# Patient Record
Sex: MALE | Race: WHITE | NOT HISPANIC OR LATINO | Employment: FULL TIME | URBAN - NONMETROPOLITAN AREA
[De-identification: names, ages, dates, MRNs, and addresses within clinical notes are randomized per-mention and may not be internally consistent; named-entity substitution may affect disease eponyms.]

---

## 2020-05-13 ENCOUNTER — VIRTUAL VISIT (OUTPATIENT)
Dept: FAMILY MEDICINE | Facility: OTHER | Age: 60
End: 2020-05-13
Attending: NURSE PRACTITIONER
Payer: COMMERCIAL

## 2020-05-13 DIAGNOSIS — R52 BODY ACHES: Primary | ICD-10-CM

## 2020-05-13 PROCEDURE — 99441 ZZC PHYSICIAN TELEPHONE EVALUATION 5-10 MIN: CPT | Performed by: NURSE PRACTITIONER

## 2020-05-13 ASSESSMENT — ENCOUNTER SYMPTOMS
VOMITING: 0
ABDOMINAL PAIN: 0
CHILLS: 1
COUGH: 0
SINUS PRESSURE: 0
NAUSEA: 1
SHORTNESS OF BREATH: 0
SINUS PAIN: 0
FEVER: 1

## 2020-05-13 ASSESSMENT — PAIN SCALES - GENERAL: PAINLEVEL: MILD PAIN (3)

## 2020-05-13 NOTE — PROGRESS NOTES
"Buddy Bosch is a 59 year old male who is being evaluated via a billable telephone visit.      The patient has been notified of following:     \"This telephone visit will be conducted via a call between you and your physician/provider. We have found that certain health care needs can be provided without the need for a physical exam.  This service lets us provide the care you need with a short phone conversation.  If a prescription is necessary we can send it directly to your pharmacy.  If lab work is needed we can place an order for that and you can then stop by our lab to have the test done at a later time.    Telephone visits are billed at different rates depending on your insurance coverage. During this emergency period, for some insurers they may be billed the same as an in-person visit.  Please reach out to your insurance provider with any questions.    If during the course of the call the physician/provider feels a telephone visit is not appropriate, you will not be charged for this service.\"    Patient has given verbal consent for Telephone visit?  Yes    What phone number would you like to be contacted at? [phone]    How would you like to obtain your AVS? Mail a copy    Subjective     Buddy Bosch is a 59 year old male who presents to clinic today for the following health issues:    She reports body aches, nausea, stuffy nose and fever. He notes he hasn't been able check his temperature. He reports this started several days ago. He reports symptoms are staying the same. No vomiting. No abdominal pain. No diarrhea. He has been self isolating at home while he is ill.   His fiance is coming in today for a swab. She had symptoms before patient.   He works at [employer].   He is a smoker.   He has treated with Advil Cold and Flu.     HPI  There is no problem list on file for this patient.    History reviewed. No pertinent surgical history.    Social History     Tobacco Use     Smoking status: " Current Every Day Smoker     Smokeless tobacco: Never Used   Substance Use Topics     Alcohol use: Yes     History reviewed. No pertinent family history.      No current outpatient medications on file.     No Known Allergies    Reviewed and updated as needed this visit by Provider         Review of Systems   Constitutional: Positive for chills and fever.   HENT: Positive for congestion. Negative for ear discharge, ear pain, sinus pressure and sinus pain.    Respiratory: Negative for cough and shortness of breath.    Gastrointestinal: Positive for nausea. Negative for abdominal pain and vomiting.        Objective   Reported vitals:  There were no vitals taken for this visit.   alert and no distress  PSYCH: Alert and oriented times 3; coherent speech, normal   rate and volume, able to articulate logical thoughts, able   to abstract reason, no tangential thoughts, no hallucinations   or delusions  His affect is normal  RESP: No cough, no audible wheezing, able to talk in full sentences  Remainder of exam unable to be completed due to telephone visits    Diagnostic Test Results:  none         Assessment/Plan:  1. Body aches  Patient is symptomatic, but does not meet criteria currently for testing. No co-morbidities and is not a healthcare worker. His wife is symptomatic and had symptoms first. She meets criteria for testing, and will be presenting for curbside testing today. We agreed that we will await results for his fiance test first, since she first had symptoms. If she is positive patient would meet criteria to have testing himself. If she is negative, they will monitor symptoms at home. If other symptoms develop encouraged to be seen for further evaluation.       No follow-ups on file.      Phone call duration:  8 minutes    Jonna Mckeon NP

## 2020-10-09 ENCOUNTER — OFFICE VISIT (OUTPATIENT)
Dept: FAMILY MEDICINE | Facility: OTHER | Age: 60
End: 2020-10-09
Attending: PHYSICIAN ASSISTANT
Payer: COMMERCIAL

## 2020-10-09 VITALS
HEIGHT: 67 IN | SYSTOLIC BLOOD PRESSURE: 120 MMHG | HEART RATE: 76 BPM | OXYGEN SATURATION: 96 % | DIASTOLIC BLOOD PRESSURE: 70 MMHG | RESPIRATION RATE: 20 BRPM | BODY MASS INDEX: 27.47 KG/M2 | WEIGHT: 175 LBS | TEMPERATURE: 98.5 F

## 2020-10-09 DIAGNOSIS — Z00.00 ROUTINE GENERAL MEDICAL EXAMINATION AT A HEALTH CARE FACILITY: Primary | ICD-10-CM

## 2020-10-09 DIAGNOSIS — Z87.891 PERSONAL HISTORY OF TOBACCO USE: ICD-10-CM

## 2020-10-09 DIAGNOSIS — R17 SCLERAL ICTERUS: ICD-10-CM

## 2020-10-09 DIAGNOSIS — L91.8 SKIN TAG: ICD-10-CM

## 2020-10-09 DIAGNOSIS — Z23 NEED FOR INFLUENZA VACCINATION: ICD-10-CM

## 2020-10-09 LAB
ALBUMIN SERPL-MCNC: 4.4 G/DL (ref 3.5–5.7)
ALP SERPL-CCNC: 57 U/L (ref 34–104)
ALT SERPL W P-5'-P-CCNC: 69 U/L (ref 7–52)
ANION GAP SERPL CALCULATED.3IONS-SCNC: 8 MMOL/L (ref 3–14)
AST SERPL W P-5'-P-CCNC: 48 U/L (ref 13–39)
BASOPHILS # BLD AUTO: 0.1 10E9/L (ref 0–0.2)
BASOPHILS NFR BLD AUTO: 0.6 %
BILIRUB DIRECT SERPL-MCNC: 0.2 MG/DL (ref 0–0.2)
BILIRUB SERPL-MCNC: 0.8 MG/DL (ref 0.3–1)
BUN SERPL-MCNC: 14 MG/DL (ref 7–25)
CALCIUM SERPL-MCNC: 9.8 MG/DL (ref 8.6–10.3)
CHLORIDE SERPL-SCNC: 102 MMOL/L (ref 98–107)
CHOLEST SERPL-MCNC: 195 MG/DL
CO2 SERPL-SCNC: 26 MMOL/L (ref 21–31)
CREAT SERPL-MCNC: 1.2 MG/DL (ref 0.7–1.3)
DIFFERENTIAL METHOD BLD: NORMAL
EOSINOPHIL # BLD AUTO: 0.3 10E9/L (ref 0–0.7)
EOSINOPHIL NFR BLD AUTO: 2.4 %
ERYTHROCYTE [DISTWIDTH] IN BLOOD BY AUTOMATED COUNT: 12.4 % (ref 10–15)
GFR SERPL CREATININE-BSD FRML MDRD: 62 ML/MIN/{1.73_M2}
GLUCOSE SERPL-MCNC: 96 MG/DL (ref 70–105)
HCT VFR BLD AUTO: 49.2 % (ref 40–53)
HDLC SERPL-MCNC: 47 MG/DL (ref 23–92)
HGB BLD-MCNC: 16.7 G/DL (ref 13.3–17.7)
HIV1+2 AB SPEC QL IA.RAPID: NONREACTIVE
IMM GRANULOCYTES # BLD: 0 10E9/L (ref 0–0.4)
IMM GRANULOCYTES NFR BLD: 0.3 %
LDLC SERPL CALC-MCNC: 119 MG/DL
LYMPHOCYTES # BLD AUTO: 2.8 10E9/L (ref 0.8–5.3)
LYMPHOCYTES NFR BLD AUTO: 26.4 %
MCH RBC QN AUTO: 32.1 PG (ref 26.5–33)
MCHC RBC AUTO-ENTMCNC: 33.9 G/DL (ref 31.5–36.5)
MCV RBC AUTO: 95 FL (ref 78–100)
MONOCYTES # BLD AUTO: 0.8 10E9/L (ref 0–1.3)
MONOCYTES NFR BLD AUTO: 7.2 %
NEUTROPHILS # BLD AUTO: 6.8 10E9/L (ref 1.6–8.3)
NEUTROPHILS NFR BLD AUTO: 63.1 %
NONHDLC SERPL-MCNC: 148 MG/DL
PLATELET # BLD AUTO: 213 10E9/L (ref 150–450)
POTASSIUM SERPL-SCNC: 4 MMOL/L (ref 3.5–5.1)
PROT SERPL-MCNC: 7.6 G/DL (ref 6.4–8.9)
PSA SERPL-MCNC: 1.72 NG/ML
RBC # BLD AUTO: 5.2 10E12/L (ref 4.4–5.9)
SODIUM SERPL-SCNC: 136 MMOL/L (ref 134–144)
TRIGL SERPL-MCNC: 144 MG/DL
WBC # BLD AUTO: 10.7 10E9/L (ref 4–11)

## 2020-10-09 PROCEDURE — 80053 COMPREHEN METABOLIC PANEL: CPT | Mod: ZL | Performed by: PHYSICIAN ASSISTANT

## 2020-10-09 PROCEDURE — 93005 ELECTROCARDIOGRAM TRACING: CPT

## 2020-10-09 PROCEDURE — 86803 HEPATITIS C AB TEST: CPT | Mod: ZL | Performed by: PHYSICIAN ASSISTANT

## 2020-10-09 PROCEDURE — 90471 IMMUNIZATION ADMIN: CPT

## 2020-10-09 PROCEDURE — 85025 COMPLETE CBC W/AUTO DIFF WBC: CPT | Mod: ZL | Performed by: PHYSICIAN ASSISTANT

## 2020-10-09 PROCEDURE — 90670 PCV13 VACCINE IM: CPT

## 2020-10-09 PROCEDURE — 80061 LIPID PANEL: CPT | Mod: ZL | Performed by: PHYSICIAN ASSISTANT

## 2020-10-09 PROCEDURE — 87902 NFCT AGT GNTYP ALYS HEP C: CPT | Mod: ZL | Performed by: PHYSICIAN ASSISTANT

## 2020-10-09 PROCEDURE — 99396 PREV VISIT EST AGE 40-64: CPT | Mod: 25 | Performed by: PHYSICIAN ASSISTANT

## 2020-10-09 PROCEDURE — 99406 BEHAV CHNG SMOKING 3-10 MIN: CPT | Performed by: PHYSICIAN ASSISTANT

## 2020-10-09 PROCEDURE — 86703 HIV-1/HIV-2 1 RESULT ANTBDY: CPT | Mod: ZL | Performed by: PHYSICIAN ASSISTANT

## 2020-10-09 PROCEDURE — G0463 HOSPITAL OUTPT CLINIC VISIT: HCPCS

## 2020-10-09 PROCEDURE — 90472 IMMUNIZATION ADMIN EACH ADD: CPT

## 2020-10-09 PROCEDURE — G0463 HOSPITAL OUTPT CLINIC VISIT: HCPCS | Mod: 25

## 2020-10-09 PROCEDURE — G0009 ADMIN PNEUMOCOCCAL VACCINE: HCPCS

## 2020-10-09 PROCEDURE — 93010 ELECTROCARDIOGRAM REPORT: CPT | Performed by: INTERNAL MEDICINE

## 2020-10-09 PROCEDURE — 87522 HEPATITIS C REVRS TRNSCRPJ: CPT | Mod: ZL | Performed by: PHYSICIAN ASSISTANT

## 2020-10-09 PROCEDURE — 82248 BILIRUBIN DIRECT: CPT | Mod: ZL | Performed by: PHYSICIAN ASSISTANT

## 2020-10-09 PROCEDURE — 11200 RMVL SKIN TAGS UP TO&INC 15: CPT | Performed by: PHYSICIAN ASSISTANT

## 2020-10-09 PROCEDURE — G0296 VISIT TO DETERM LDCT ELIG: HCPCS | Performed by: PHYSICIAN ASSISTANT

## 2020-10-09 PROCEDURE — 84153 ASSAY OF PSA TOTAL: CPT | Mod: ZL | Performed by: PHYSICIAN ASSISTANT

## 2020-10-09 PROCEDURE — 36415 COLL VENOUS BLD VENIPUNCTURE: CPT | Mod: ZL | Performed by: PHYSICIAN ASSISTANT

## 2020-10-09 PROCEDURE — 90686 IIV4 VACC NO PRSV 0.5 ML IM: CPT

## 2020-10-09 PROCEDURE — G0008 ADMIN INFLUENZA VIRUS VAC: HCPCS

## 2020-10-09 SDOH — HEALTH STABILITY: MENTAL HEALTH: HOW OFTEN DO YOU HAVE A DRINK CONTAINING ALCOHOL?: 4 OR MORE TIMES A WEEK

## 2020-10-09 SDOH — HEALTH STABILITY: MENTAL HEALTH: HOW OFTEN DO YOU HAVE 6 OR MORE DRINKS ON ONE OCCASION?: LESS THAN MONTHLY

## 2020-10-09 SDOH — HEALTH STABILITY: MENTAL HEALTH: HOW MANY STANDARD DRINKS CONTAINING ALCOHOL DO YOU HAVE ON A TYPICAL DAY?: 3 OR 4

## 2020-10-09 ASSESSMENT — PAIN SCALES - GENERAL: PAINLEVEL: NO PAIN (0)

## 2020-10-09 ASSESSMENT — MIFFLIN-ST. JEOR: SCORE: 1567.42

## 2020-10-09 NOTE — PATIENT INSTRUCTIONS
"  Varenicline Tartrate (Chantix):  Chantix tablets Days                Dosage  0.5 mg                     1 through 3 0.5 mg once daily  0.5 mg                      4 through 7 0.5 mg twice daily  1.0 mg                     Day 8 to end  1 mg twice daily    Renal dosing:  For severe renal impairment, start at a dose of 0.5 mg daily and then titrate as needed to a maximum dose of 0.5 mg twice daily.  For patients with end-stage renal disease (ESRD) undergoing hemodialysis, a maximum dose of 0.5 mg daily may be administered if tolerated well.    How to take:  You will start taking Chantix one week before your quit date while you are still smoking. At first you will use the Chantix starter pack. Once you are finished with that, you will be on a \"maintenance dose\" that will probably stay the same for the rest of your treatment.  After a week of slowly increasing your dose, you will take Chantix twice a day. Take each dose after eating and with a full glass of water. Taking Chantix with food and water decreases nausea. That is also why you begin on a lower dose and slowly increase it. Once you begin the twice a day dosage, your 2 doses should be at least 8-10 hours apart and at least 4-5 hours before bedtime.      Duration of therapy: 12 weeks.  An additional course of 12 weeks of treatment is recommended if the first 12 weeks were successful to improve long term abstinence.    Adverse reactions:  The most common adverse reaction associated with varenicline treatment is nausea. Other common adverse effects include sleep disturbance, constipation, flatulence and vomiting.    There have been warnings from the FDA to be on the lookout for erratic behavior, suicidal ideation, or suicidal behavior.   There is one case report of a death, though it appears alcohol consumption may have played a part in that case.    Please report any behavior or mood changes as well as being aware of drowsiness.   Be cautious when operating " motor vehicles or dangerous machinery until the medication's effect on you is clear.    Zyban / Wellbutrin    Dosing:    Before Your Quit Date: Dose   Days 1-3 Take 150 mg by mouth once daily in the morning.   Days 4-7 (or up to 4-14 days) Take 150 mg by mouth twice daily in the morning and evening.   After Your Quit Date:    Treatment usually continues 7-12 weeks Take 150 mg by mouth twice daily in the morning and evening.       Some tips:    You will start taking bupropion at least 1 week before quitting smoking. It is okay to smoke while using bupropion.     You can use nicotine replacement therapy such as nicotine gum while using  Bupropion.     Take your 2 daily doses at least 8 hours apart.     DO NOT CRUSH OR BREAK TABLETS. Swallow the tablet whole.     You can take your medication with or without food.     Do not drink alcohol while taking this medication.     Side Effects:    Some people may experience dry mouth and insomnia. These may resolve in time.     Small frequent meals, frequent mouth care, chewing gum, or sucking lozenges may help with dry mouth.     Other possible rare side effects include constipation, nausea, headache, drowsiness, and weight loss may occur.     If you experience any other side effects that are troublesome, or if you feel something is not right, call your health care professional.           Nicotine Gum (Nicorette, polacrilex nicotine gum)      Dosing:    >25 cigarettes per day Dose   Weeks 1-6 Chew 1 piece of 4 mg gum every 1-2 hours. Maximum of 24 pieces a day.   Weeks 7-9 Chew 1 piece of 4 mg gum every 2-4 hours. Maximum of 24 pieces a day.   Weeks 10-12 Chew 1 piece of 4 mg gum every 4-8 hours. Maximum of 24 pieces a day.   < 25 cigarettes per day    Weeks 1-6 Chew 1 piece of 2 mg gum every 1-2 hours. Maximum of 24 pieces a day.   Weeks 7-9 Chew 1 piece of 2 mg gum every 2-4 hours. Maximum of 24 pieces a day.   Weeks 10-12 Chew 1 piece of 2 mg gum every 4-8 hours. Maximum of  "24 pieces a day.     How to use nicotine gum:    Chew the gum slowly until you notice a tingly feeling or peppery taste.     Then \"park\" the gum between your teeth and your cheek and let it sit there until you don't notice the tingly feeling or taste anymore.     Chew the gum slowly again until you notice the tingly feeling or peppery taste again and \"park\" the gum on the other side of your mouth.     Repeat this process for 30 minutes, then throw the gum away.     Especially at the beginning, use the gum whenever you would normally smoke a cigarette.    Some tips:    Do not smoke while you are using nicotine gum.     Do not swallow the gum.     Do not chew the gum like normal gum--you will swallow the nicotine instead of absorbing it. You are also more likely to get indigestion or nausea.     Do not drink anything, including water, while you are chewing gum.     Avoid acidic drinks like coffee and pop right before chewing the gum.     As your body becomes less dependent on nicotine, you will need to chew less gum.     Follow up with your health care provider if you have any questions and also to help taper your nicotine gum dose.    Side Effects:  Some people may experience some indigestion or nausea. Using proper chewing technique may help. If you experience any other troublesome or unusual side effects, call your health care provider.    Lung Cancer Screening   Frequently Asked Questions  If you are at high-risk for lung cancer, getting screened with low-dose computed tomography (LDCT) every year can help save your life. This handout offers answers to some of the most common questions about lung cancer screening. If you have other questions, please call [phone] ([phone]).     What is it?  Lung cancer screening uses special X-ray technology to create an image of your lung tissue. The exam is quick and easy and takes less than 10 seconds. We don t give you any medicine or use any needles. You can " eat before and after the exam. You don t need to change your clothes as long as the clothing on your chest doesn t contain metal. But, you do need to be able to hold your breath for at least 6 seconds during the exam.    What is the goal of lung cancer screening?  The goal of lung cancer screening is to save lives. Many times, lung cancer is not found until a person starts having physical symptoms. Lung cancer screening can help detect lung cancer in the earliest stages when it may be easier to treat.    Who should be screened for lung cancer?  We suggest lung cancer screening for anyone who is at high-risk for lung cancer. You are in the high-risk group if you:      are between the ages of 55 and 79, and    have smoked at least 1 pack of cigarettes a day for 30 or more years, and    still smoke or have quit within the past 15 years.    However, if you have a new cough or shortness of breath, you should talk to your doctor before being screened.    Some national lung health advocacy groups also recommend screening for people ages 50 to 79 who have smoked an average of 1 pack of cigarettes a day for 20 years. They must also have at least 1 other risk factor for lung cancer, not including exposure to secondhand smoke. Other risk factors are having had cancer in the past, emphysema, pulmonary fibrosis, COPD, a family history of lung cancer, or exposure to certain materials such as arsenic, asbestos, beryllium, cadmium, chromium, diesel fumes, nickel, radon or silica. Your care team can help you know if you have one of these risk factors.     Why does it matter if I have symptoms?  Certain symptoms can be a sign that you have a condition in your lungs that should be checked and treated by your doctor. These symptoms include fever, chest pain, a new or changing cough, shortness of breath that you have never felt before, coughing up blood or unexplained weight loss. Having any of these symptoms can greatly affect the  results of lung cancer screening.       Should all smokers get an LDCT lung cancer screening exam?  It depends. Lung cancer screening is for a very specific group of men and women who have a history of heavy smoking over a long period of time (see  Who should be screened for lung cancer  above).  I am in the high-risk group, but have been diagnosed with cancer in the past. Is LDCT lung cancer screening right for me?  In some cases, you should not have LDCT lung screening, such as when your doctor is already following your cancer with CT scan studies. Your doctor will help you decide if LDCT lung screening is right for you.  Do I need to have a screening exam every year?  Yes. If you are in the high-risk group described earlier, you should get an LDCT lung cancer screening exam every year until you are 79, or are no longer willing or able to undergo screening and possible procedures to diagnose and treat lung cancer.  How effective is LDCT at preventing death from lung cancer?  Studies have shown that LDCT lung cancer screening can lower the risk of death from lung cancer by 20 percent in people who are at high-risk.  What are the risks?  There are some risks and limitations of LDCT lung cancer screening. We want to make sure you understand the risks and benefits, so please let us know if you have any questions. Your doctor may want to talk with you more about these risks.    Radiation exposure: As with any exam that uses radiation, there is a very small increased risk of cancer. The amount of radiation in LDCT is small--about the same amount a person would get from a mammogram. Your doctor orders the exam when he or she feels the potential benefits outweigh the risks.    False negatives: No test is perfect, including LDCT. It is possible that you may have a medical condition, including lung cancer, that is not found during your exam. This is called a false negative result.    False positives and more testing: LDCT  very often finds something in the lung that could be cancer, but in fact is not. This is called a false positive result. False positive tests often cause anxiety. To make sure these findings are not cancer, you may need to have more tests. These tests will be done only if you give us permission. Sometimes patients need a treatment that can have side effects, such as a biopsy. For more information on false positives, see  What can I expect from the results?     Findings not related to lung cancer: Your LDCT exam also takes pictures of areas of your body next to your lungs. In a very small number of cases, the CT scan will show an abnormal finding in one of these areas, such as your kidneys, adrenal glands, liver or thyroid. This finding may not be serious, but you may need more tests. Your doctor can help you decide what other tests you may need, if any.  What can I expect from the results?  About 1 out of 4 LDCT exams will find something that may need more tests. Most of the time, these findings are lung nodules. Lung nodules are very small collections of tissue in the lung. These nodules are very common, and the vast majority--more than 97 percent--are not cancer (benign). Most are normal lymph nodes or small areas of scarring from past infections.  But, if a small lung nodule is found to be cancer, the cancer can be cured more than 90 percent of the time. To know if the nodule is cancer, we may need to get more images before your next yearly screening exam. If the nodule has suspicious features (for example, it is large, has an odd shape or grows over time), we will refer you to a specialist for further testing.  Will my doctor also get the results?  Yes. Your doctor will get a copy of your results.  Is it okay to keep smoking now that there s a cancer screening exam?  No. Tobacco is one of the strongest cancer-causing agents. It causes not only lung cancer, but other cancers and cardiovascular (heart) diseases as  well. The damage caused by smoking builds over time. This means that the longer you smoke, the higher your risk of disease. While it is never too late to quit, the sooner you quit, the better.  Where can I find help to quit smoking?  The best way to prevent lung cancer is to stop smoking. If you have already quit smoking, congratulations and keep it up! For help on quitting smoking, please call [employer] at 9-630-882-QCID (2476) or the [employer] at 1-210.749.4745 to find local resources near you.  One-on-one health coaching:  If you d prefer to work individually with a health care provider on tobacco cessation, we offer:      Medication Therapy Management:  Our specially trained pharmacists work closely with you and your doctor to help you quit smoking.  Call [phone] or [phone] (toll free).     Can Do: Health coaching offered by [facility].  [email or web address]

## 2020-10-09 NOTE — PROGRESS NOTES
Lung Cancer Screening Shared Decision Making Visit     Buddy Bosch is eligible for lung cancer screening on the basis of the information provided in my signed lung cancer screening order.     I have discussed with patient the risks and benefits of screening for lung cancer with low-dose CT.     The risks include:  radiation exposure: one low dose chest CT has as much ionizing radiation as about 15 chest x-rays or 6 months of background radiation living in [place]    false positives: 96% of positive findings/nodules are NOT cancer, but some might still require additional diagnostic evaluation, including biopsy  over-diagnosis: some slow growing cancers that might never have been clinically significant will be detected and treated unnecessarily     The benefit of early detection of lung cancer is contingent upon adherence to annual screening or more frequent follow up if indicated.     Furthermore, reaping the benefits of screening requires Buddy Bosch to be willing and physically able to undergo diagnostic procedures, if indicated. Although no specific guide is available for determining severity of comorbidities, it is reasonable to withhold screening in patients who have greater mortality risk from other diseases.     We did discuss that the only way to prevent lung cancer is to not smoke. Smoking cessation counseling was not given.      I did offer risk estimation using a calculator such as this one:    ShouldIScreen    SUBJECTIVE:   Buddy Bosch is a 60 year old male here for the following health issues:    HPI  Annual exam.  He has not been to a healthcare provider in approximately 30 years.    History of smoking  He has a 30+ year pack year history of smoking.  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  He has tried to quit several times, with the longest duration of quitting episode of 3 years.  He is interested in lung cancer screening and smoking cessation  counseling today.  He has occasionally noted increased put him yellow in color, no blood, no green, no purulence, no fevers or chills or night sweats with these episodes.    Alcoholism and hepatitis C  At some point he was told that he tested positive for hepatitis C when he was in a treatment program for alcoholism.  He states he is had improvement in his alcoholism and is now down to 3-4 beers a night.  Previously had been 12 to 24 pack a night.  He has had several episodes of waking up with scleral icterus.      Hematochezia  In the last few weeks he has noted some bright red blood on the stool and when he wipes.  He has had this a few times in the past.  He states he had a colonoscopy sometime in the 1980s which noted polyps.  He has not noted any change in the size or shape of the stool with [place] stool chart average around 4.  He has not noted any change in appetite or weight.    General social health, diet, and exercise  He is currently laid off and work for a company that makes [employer].  He moved from [place] 3 years ago for his job.  He is originally born in [place] and moved to the [place] when he was 6 years old.  His appetite is generally well with lots of bakes goods and steamed vegetables.  He tries to avoid fried foods.  He is generally physically fit and recently went on a 11 mile hike with his fiancée.  He is interested in smoking cessation and has the support of his fiancée but doesn't want nicotine replacement at this time.  No concern for anxiety or depression.  When he was in the service he had his right leg pinned/crushed between a touret and a gun loyola and was left with some indentation of the right thigh.  Occasionally he feels some pain in this thigh.  When he was in the service he also worked at a [employer] and fell from approximately 30 feet and sustained a hyperextension lower back injury.  He was in traction for several weeks following that injury.  He has not noted any chronic issues  related to that injury such as radiculopathy or weakness in the lower extremities.  Occasionally has some lower back fatigue, but otherwise he does not complain about his back.  He states he sleeps through the night without nocturia. no troubles with voiding or urinary retention.    Medications/Vitamins  He takes vitamin D, cranberry, and milk thistle.  No other medications.    Allergies  No known allergies    GAD7  No flowsheet data found.    PHQ9  No flowsheet data found.    There are no active problems to display for this patient.    History reviewed. No pertinent past medical history.  History reviewed. No pertinent surgical history.  History reviewed. No pertinent family history.  Social History     Tobacco Use     Smoking status: Current Every Day Smoker     Smokeless tobacco: Never Used   Substance Use Topics     Alcohol use: Yes     Drug use: Never     Allergies:  No Known Allergies  Medications:  No current outpatient medications on file.     Review of Systems   Constitutional: No recent weight change, weakness, fatigue, fever, or night sweats  Skin: 2 mm diameter skin tag on the chin, otherwise, no rashes, lumps, sores, itching, dryness, color change, changes in hair or nails, changes in size or color of moles  Head: No headache, head injury, dizziness, lightheadedness  Eyes: No vision changes, pain, redness, excessive tearing, double or blurred vision, spots, specks, flashing lights, glaucoma, or cataracts  Ears: Hearing good, no tinnitus, vertigo, or infections.  Nose: No rhinorrhea, hay fever, sinus trouble, or epistaxis  Throat: No bleeding of the gums or canker sores.  Neck: No lumps, goiter, pain, or swollen glands.    Respiratory: Occasional cough, otherwise no wheezing, shortness of breath, dyspnea on exertion, or snoring.    Cardiovascular: No dyspnea, orthopnea, chest pain, or palpitations.    Gastrointestinal: Positive for bright red blood streaking the stool and when wiping appetite good; no  "nausea, vomiting, indigestion, diarrhea, constipation, abdominal pain, dysphagia, or odynophagia  Urinary: No frequency, dysuria, nocturia, hesitancy, incontinence, hematuria, or recent flank pain.  Genital: Male: No hernias, discharge from or sores on penis, testicular pain or masses, history of sexually transmitted diseases or treatments  Peripheral vascular: No phlebitis, leg pain, claudication or leg cramps, varicose veins, or lower extremity edema; no color changes in foot fingertips or toes during cold weather  Musculoskeletal: Rare and occasional low back pain, otherwise, no muscle or joint pain, stiffness, arthritis, gout, or backaches.  Psychiatric: No concern for anxiety or depression  Neurologic: No changes in mood, intention, or speech; no changes in orientation, memory, and insight, or judgment; no headache, dizziness, vertigo; no fainting, blackouts, seizures, weakness, paralysis, numbness or loss of sensation, tingling or pins-and-needles, no tremors or other involuntary movements; no seizures.  Hematologic: No anemia, easy bruising or bleeding, prior transfusions, or transfusion reaction.  Endocrine: No thyroid trouble, heat or cold intolerance, excessive sweating, excessive thirst or hunger, polyuria, change in glove or shoe size.    OBJECTIVE:     Vitals:    10/09/20 1601   BP: 120/70   BP Location: Right arm   Patient Position: Sitting   Cuff Size: Adult Regular   Pulse: 76   Resp: 20   Temp: 98.5  F (36.9  C)   TempSrc: Temporal   SpO2: 96%   Weight: 79.4 kg (175 lb)   Height: 1.702 m (5' 7\")     Body mass index is 27.41 kg/m .    Physical Exam  General Appearance: Pleasant, alert, appropriate appearance for age and circumstances, no acute distress  Head: Normocephalic, atraumatic  Eyes: PERRL, EOMI  Ears: TM's pearly gray and intact bilaterally. Normal auditory canals and external ears   OroPharynx: Dental hygiene adequate. Normal buccal mucosa. Normal pharynx. No exudates or petechia " noted.  Neck: Supple, no masses or lymphadenopathy. Thyroid smooth and rubbery in texture without palpable nodules  Lungs: Normal chest wall and respirations. Clear to auscultation, no wheezes, rales, rhonchi, or stridor  Cardiovascular: Regular rate and rhythm. S1 and S2 audible, no murmurs, clicks, rubs, or gallops  Gastrointestinal: Liver border approximately 2 cm below the right costal margin, smooth, nontender, approximately 12 cm in height, otherwise, abdomen symmetrical, soft, nontender, no masses, guarding, or tympany, normoactive bowel sounds  Musculoskeletal: No discernable muscle atrophy or weakness; full joint range of motion, no instability, redness, swelling, or tenderness; no discernable spine deviation or gait abnormalities  Skin: 2 mm in diameter and 2 mm raised skin tag on the chin, no concerning or new rashes  Vascular: Radial, posterior tibial, and dorsalis pedis pulses present bilaterally and not bounding or thready; No carotid bruit on auscultation; No jugular venous distention with liver palpation; No lower extremity edema or varicose veins.  Neurologic Exam: CN 2-12 grossly intact.  Normal gait.  Symmetric DTRs. No focal motor or sensory deficits. No tremor.  Psychiatric Exam: Alert and oriented, appropriate affect    Diagnostic Test Results:  Results for orders placed or performed in visit on 10/09/20   EKG 12-lead complete w/read - Clinics     Status: None (Preliminary result)   Result Value Ref Range    Interpretation ECG Click View Image link to view waveform and result      EKG today dated 10/09/20 personally reviewed and shows:   Sinus bradycardia without ectopy  Possible left atrial enlargement  Septal infarct, age undetermined, ventricular rate 53  DE interval 166 ms  QRS duration 72 ms  QT/QTc 410/384 ms  PRT axis 65 20 41    No previous EKG for comparison.    ASSESSMENT/PLAN:     1. Routine general medical examination at a health care facility    2. Skin tag    3. Scleral icterus     4. Personal history of tobacco use    5. Need for influenza vaccination      Orders Placed This Encounter   Procedures     REMOVAL OF SKIN TAGS, FIRST 15     Prof fee: Shared Decisionmaking for Lung Cancer Screening     CT Chest Lung Cancer Scrn Low Dose wo     GH IMM-  PNEUMOCOCCAL CONJ VACCINE 13 VALENT IM     GH-IMM- FLU VAC PRESRV FREE QUAD SPLIT VIR > 6 MONTHS IM     Lipid Panel     CBC and Differential     Comprehensive Metabolic Panel     Prostate Specific Antigen     Hepatitis C Screen Reflex to HCV RNA Quant and Genotype     Bilirubin, Total and Direct     HIV Rapid Antibody Screen     Bilirubin direct     GASTROENTEROLOGY ADULT REF PROCEDURE ONLY     EKG 12-lead complete w/read - Clinics       He is not fasting so he will come in Monday morning for lab work.    He agreed to flu, pneumococcal, PSA, hep C, and HIV screens    We will get a baseline EKG today    1 skin tag frozen by 3 freeze thaw cycles on his chin.  verbal consent obtained.  Patient tolerated procedure well.  Educated about signs of infection eluding purulence, bleeding, erythema, warmth.  Advised use triple antibiotic and Band-Aid if needed.    Due to history of polyps and bright red blood per stool recommend a colonoscopy today.  He denies any acid reflux and I saw no indications for upper endoscopy.    Due to his history of smoking we initiated low-dose CT scan for lung cancer.    We will follow-up after lab results.    Tobacco Cessation:   reports that he has been smoking. He has never used smokeless tobacco.  Tobacco Cessation Action Plan: Phone counseling: Place order for Tobacco Cessation Referral  Shared Medical Visit / Group Education  Self help information given to patient    25 minutes were spent with the patient, greater than 50% was in coordination of further care and counseling of the above medical problems.     ANDI Sanchez  [facility]    This document was prepared using voice generated software.   While every attempt was made for accuracy, grammatical errors may exist.

## 2020-10-09 NOTE — NURSING NOTE
"Chief Complaint   Patient presents with     Physical     Patient is here for a physical.   Initial /70 (BP Location: Right arm, Patient Position: Sitting, Cuff Size: Adult Regular)   Pulse 76   Temp 98.5  F (36.9  C) (Temporal)   Resp 20   Ht 1.702 m (5' 7\")   Wt 79.4 kg (175 lb)   SpO2 96%   BMI 27.41 kg/m   Estimated body mass index is 27.41 kg/m  as calculated from the following:    Height as of this encounter: 1.702 m (5' 7\").    Weight as of this encounter: 79.4 kg (175 lb).  Medication Reconciliation: complete    Tequila Ruano LPN  "

## 2020-10-09 NOTE — H&P (VIEW-ONLY)
Lung Cancer Screening Shared Decision Making Visit     Buddy Bosch is eligible for lung cancer screening on the basis of the information provided in my signed lung cancer screening order.     I have discussed with patient the risks and benefits of screening for lung cancer with low-dose CT.     The risks include:  radiation exposure: one low dose chest CT has as much ionizing radiation as about 15 chest x-rays or 6 months of background radiation living in [place]    false positives: 96% of positive findings/nodules are NOT cancer, but some might still require additional diagnostic evaluation, including biopsy  over-diagnosis: some slow growing cancers that might never have been clinically significant will be detected and treated unnecessarily     The benefit of early detection of lung cancer is contingent upon adherence to annual screening or more frequent follow up if indicated.     Furthermore, reaping the benefits of screening requires Buddy Bosch to be willing and physically able to undergo diagnostic procedures, if indicated. Although no specific guide is available for determining severity of comorbidities, it is reasonable to withhold screening in patients who have greater mortality risk from other diseases.     We did discuss that the only way to prevent lung cancer is to not smoke. Smoking cessation counseling was not given.      I did offer risk estimation using a calculator such as this one:    ShouldIScreen    SUBJECTIVE:   Buddy Bsoch is a 60 year old male here for the following health issues:    HPI  Annual exam.  He has not been to a healthcare provider in approximately 30 years.    History of smoking  He has a 30+ year pack year history of smoking.  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  He has tried to quit several times, with the longest duration of quitting episode of 3 years.  He is interested in lung cancer screening and smoking cessation  counseling today.  He has occasionally noted increased put him yellow in color, no blood, no green, no purulence, no fevers or chills or night sweats with these episodes.    Alcoholism and hepatitis C  At some point he was told that he tested positive for hepatitis C when he was in a treatment program for alcoholism.  He states he is had improvement in his alcoholism and is now down to 3-4 beers a night.  Previously had been 12 to 24 pack a night.  He has had several episodes of waking up with scleral icterus.      Hematochezia  In the last few weeks he has noted some bright red blood on the stool and when he wipes.  He has had this a few times in the past.  He states he had a colonoscopy sometime in the 1980s which noted polyps.  He has not noted any change in the size or shape of the stool with [place] stool chart average around 4.  He has not noted any change in appetite or weight.    General social health, diet, and exercise  He is currently laid off and work for a company that makes [employer].  He moved from [place] 3 years ago for his job.  He is originally born in [place] and moved to the [place] when he was 6 years old.  His appetite is generally well with lots of bakes goods and steamed vegetables.  He tries to avoid fried foods.  He is generally physically fit and recently went on a 11 mile hike with his fiancée.  He is interested in smoking cessation and has the support of his fiancée but doesn't want nicotine replacement at this time.  No concern for anxiety or depression.  When he was in the service he had his right leg pinned/crushed between a touret and a gun loyola and was left with some indentation of the right thigh.  Occasionally he feels some pain in this thigh.  When he was in the service he also worked at a [employer]ry and fell from approximately 30 feet and sustained a hyperextension lower back injury.  He was in traction for several weeks following that injury.  He has not noted any chronic issues  related to that injury such as radiculopathy or weakness in the lower extremities.  Occasionally has some lower back fatigue, but otherwise he does not complain about his back.  He states he sleeps through the night without nocturia. no troubles with voiding or urinary retention.    Medications/Vitamins  He takes vitamin D, cranberry, and milk thistle.  No other medications.    Allergies  No known allergies    GAD7  No flowsheet data found.    PHQ9  No flowsheet data found.    There are no active problems to display for this patient.    History reviewed. No pertinent past medical history.  History reviewed. No pertinent surgical history.  History reviewed. No pertinent family history.  Social History     Tobacco Use     Smoking status: Current Every Day Smoker     Smokeless tobacco: Never Used   Substance Use Topics     Alcohol use: Yes     Drug use: Never     Allergies:  No Known Allergies  Medications:  No current outpatient medications on file.     Review of Systems   Constitutional: No recent weight change, weakness, fatigue, fever, or night sweats  Skin: 2 mm diameter skin tag on the chin, otherwise, no rashes, lumps, sores, itching, dryness, color change, changes in hair or nails, changes in size or color of moles  Head: No headache, head injury, dizziness, lightheadedness  Eyes: No vision changes, pain, redness, excessive tearing, double or blurred vision, spots, specks, flashing lights, glaucoma, or cataracts  Ears: Hearing good, no tinnitus, vertigo, or infections.  Nose: No rhinorrhea, hay fever, sinus trouble, or epistaxis  Throat: No bleeding of the gums or canker sores.  Neck: No lumps, goiter, pain, or swollen glands.    Respiratory: Occasional cough, otherwise no wheezing, shortness of breath, dyspnea on exertion, or snoring.    Cardiovascular: No dyspnea, orthopnea, chest pain, or palpitations.    Gastrointestinal: Positive for bright red blood streaking the stool and when wiping appetite good; no  "nausea, vomiting, indigestion, diarrhea, constipation, abdominal pain, dysphagia, or odynophagia  Urinary: No frequency, dysuria, nocturia, hesitancy, incontinence, hematuria, or recent flank pain.  Genital: Male: No hernias, discharge from or sores on penis, testicular pain or masses, history of sexually transmitted diseases or treatments  Peripheral vascular: No phlebitis, leg pain, claudication or leg cramps, varicose veins, or lower extremity edema; no color changes in foot fingertips or toes during cold weather  Musculoskeletal: Rare and occasional low back pain, otherwise, no muscle or joint pain, stiffness, arthritis, gout, or backaches.  Psychiatric: No concern for anxiety or depression  Neurologic: No changes in mood, intention, or speech; no changes in orientation, memory, and insight, or judgment; no headache, dizziness, vertigo; no fainting, blackouts, seizures, weakness, paralysis, numbness or loss of sensation, tingling or pins-and-needles, no tremors or other involuntary movements; no seizures.  Hematologic: No anemia, easy bruising or bleeding, prior transfusions, or transfusion reaction.  Endocrine: No thyroid trouble, heat or cold intolerance, excessive sweating, excessive thirst or hunger, polyuria, change in glove or shoe size.    OBJECTIVE:     Vitals:    10/09/20 1601   BP: 120/70   BP Location: Right arm   Patient Position: Sitting   Cuff Size: Adult Regular   Pulse: 76   Resp: 20   Temp: 98.5  F (36.9  C)   TempSrc: Temporal   SpO2: 96%   Weight: 79.4 kg (175 lb)   Height: 1.702 m (5' 7\")     Body mass index is 27.41 kg/m .    Physical Exam  General Appearance: Pleasant, alert, appropriate appearance for age and circumstances, no acute distress  Head: Normocephalic, atraumatic  Eyes: PERRL, EOMI  Ears: TM's pearly gray and intact bilaterally. Normal auditory canals and external ears   OroPharynx: Dental hygiene adequate. Normal buccal mucosa. Normal pharynx. No exudates or petechia " noted.  Neck: Supple, no masses or lymphadenopathy. Thyroid smooth and rubbery in texture without palpable nodules  Lungs: Normal chest wall and respirations. Clear to auscultation, no wheezes, rales, rhonchi, or stridor  Cardiovascular: Regular rate and rhythm. S1 and S2 audible, no murmurs, clicks, rubs, or gallops  Gastrointestinal: Liver border approximately 2 cm below the right costal margin, smooth, nontender, approximately 12 cm in height, otherwise, abdomen symmetrical, soft, nontender, no masses, guarding, or tympany, normoactive bowel sounds  Musculoskeletal: No discernable muscle atrophy or weakness; full joint range of motion, no instability, redness, swelling, or tenderness; no discernable spine deviation or gait abnormalities  Skin: 2 mm in diameter and 2 mm raised skin tag on the chin, no concerning or new rashes  Vascular: Radial, posterior tibial, and dorsalis pedis pulses present bilaterally and not bounding or thready; No carotid bruit on auscultation; No jugular venous distention with liver palpation; No lower extremity edema or varicose veins.  Neurologic Exam: CN 2-12 grossly intact.  Normal gait.  Symmetric DTRs. No focal motor or sensory deficits. No tremor.  Psychiatric Exam: Alert and oriented, appropriate affect    Diagnostic Test Results:  Results for orders placed or performed in visit on 10/09/20   EKG 12-lead complete w/read - Clinics     Status: None (Preliminary result)   Result Value Ref Range    Interpretation ECG Click View Image link to view waveform and result      EKG today dated 10/09/20 personally reviewed and shows:   Sinus bradycardia without ectopy  Possible left atrial enlargement  Septal infarct, age undetermined, ventricular rate 53  MA interval 166 ms  QRS duration 72 ms  QT/QTc 410/384 ms  PRT axis 65 20 41    No previous EKG for comparison.    ASSESSMENT/PLAN:     1. Routine general medical examination at a health care facility    2. Skin tag    3. Scleral icterus     4. Personal history of tobacco use    5. Need for influenza vaccination      Orders Placed This Encounter   Procedures     REMOVAL OF SKIN TAGS, FIRST 15     Prof fee: Shared Decisionmaking for Lung Cancer Screening     CT Chest Lung Cancer Scrn Low Dose wo     GH IMM-  PNEUMOCOCCAL CONJ VACCINE 13 VALENT IM     GH-IMM- FLU VAC PRESRV FREE QUAD SPLIT VIR > 6 MONTHS IM     Lipid Panel     CBC and Differential     Comprehensive Metabolic Panel     Prostate Specific Antigen     Hepatitis C Screen Reflex to HCV RNA Quant and Genotype     Bilirubin, Total and Direct     HIV Rapid Antibody Screen     Bilirubin direct     GASTROENTEROLOGY ADULT REF PROCEDURE ONLY     EKG 12-lead complete w/read - Clinics       He is not fasting so he will come in Monday morning for lab work.    He agreed to flu, pneumococcal, PSA, hep C, and HIV screens    We will get a baseline EKG today    1 skin tag frozen by 3 freeze thaw cycles on his chin.  verbal consent obtained.  Patient tolerated procedure well.  Educated about signs of infection eluding purulence, bleeding, erythema, warmth.  Advised use triple antibiotic and Band-Aid if needed.    Due to history of polyps and bright red blood per stool recommend a colonoscopy today.  He denies any acid reflux and I saw no indications for upper endoscopy.    Due to his history of smoking we initiated low-dose CT scan for lung cancer.    We will follow-up after lab results.    Tobacco Cessation:   reports that he has been smoking. He has never used smokeless tobacco.  Tobacco Cessation Action Plan: Phone counseling: Place order for Tobacco Cessation Referral  Shared Medical Visit / Group Education  Self help information given to patient    25 minutes were spent with the patient, greater than 50% was in coordination of further care and counseling of the above medical problems.     ANDI Sanchez  [facility]    This document was prepared using voice generated software.   While every attempt was made for accuracy, grammatical errors may exist.

## 2020-10-12 ENCOUNTER — TELEPHONE (OUTPATIENT)
Dept: SURGERY | Facility: OTHER | Age: 60
End: 2020-10-12

## 2020-10-12 DIAGNOSIS — K92.1 BLOOD IN STOOL: Primary | ICD-10-CM

## 2020-10-12 LAB
HCV AB SERPL QL IA: REACTIVE
INTERPRETATION ECG - MUSE: NORMAL

## 2020-10-12 RX ORDER — POLYETHYLENE GLYCOL 3350, SODIUM CHLORIDE, SODIUM BICARBONATE, POTASSIUM CHLORIDE 420; 11.2; 5.72; 1.48 G/4L; G/4L; G/4L; G/4L
4000 POWDER, FOR SOLUTION ORAL ONCE
Qty: 4000 ML | Refills: 0 | Status: SHIPPED | OUTPATIENT
Start: 2020-10-12 | End: 2020-10-12

## 2020-10-12 RX ORDER — BISACODYL 5 MG
TABLET, DELAYED RELEASE (ENTERIC COATED) ORAL
Qty: 2 TABLET | Refills: 0 | Status: ON HOLD | OUTPATIENT
Start: 2020-10-12 | End: 2020-10-26

## 2020-10-12 NOTE — TELEPHONE ENCOUNTER
Screening Questions for the Scheduling of Screening Colonoscopies   (If Colonoscopy is diagnostic, Provider should review the chart before scheduling.)  Are you younger than 50 or older than 80?   NO   Do you take aspirin or fish oil?  NO  (if yes, tell patient to stop 1 week prior to Colonoscopy)  Do you take warfarin (Coumadin), clopidogrel (Plavix), apixaban (Eliquis), dabigatram (Pradaxa), rivaroxaban (Xarelto) or any blood thinner? NO   Do you use oxygen at home?  NO   Do you have kidney disease? NO   Are you on dialysis? NO   Have you had a stroke or heart attack in the last year? NO   Have you had a stent in your heart or any blood vessel in the last year? NO   Have you had a transplant of any organ? NO   Have you had a colonoscopy or upper endoscopy (EGD) before? YES          When?  20 YRS AGO   Date of scheduled Colonoscopy. 10/26/2020  Provider JAVON   Pharmacy [facility]

## 2020-10-12 NOTE — TELEPHONE ENCOUNTER
Patient referred by Dr. Yeung for a diagnostic colonoscopy,  He is having blood in his stool he also has a history of polyps.   Please advise.  Thank you.Angela Vázquez on 10/12/2020 at 10:04 AM

## 2020-10-15 ENCOUNTER — TELEPHONE (OUTPATIENT)
Dept: FAMILY MEDICINE | Facility: OTHER | Age: 60
End: 2020-10-15

## 2020-10-15 DIAGNOSIS — B18.2 CHRONIC HEPATITIS C WITHOUT HEPATIC COMA (H): Primary | ICD-10-CM

## 2020-10-15 LAB
HCV RNA SERPL NAA+PROBE-ACNC: ABNORMAL [IU]/ML
HCV RNA SERPL NAA+PROBE-LOG IU: 6.1 LOG IU/ML

## 2020-10-16 ENCOUNTER — TELEPHONE (OUTPATIENT)
Dept: FAMILY MEDICINE | Facility: OTHER | Age: 60
End: 2020-10-16

## 2020-10-16 DIAGNOSIS — B18.2 CHRONIC HEPATITIS C WITHOUT HEPATIC COMA (H): Primary | ICD-10-CM

## 2020-10-20 PROBLEM — K92.1 BLOOD IN STOOL: Status: ACTIVE | Noted: 2020-10-20

## 2020-10-20 LAB — HCV GENTYP SERPL NAA+PROBE: NORMAL

## 2020-10-22 ENCOUNTER — ALLIED HEALTH/NURSE VISIT (OUTPATIENT)
Dept: FAMILY MEDICINE | Facility: OTHER | Age: 60
End: 2020-10-22
Attending: SURGERY
Payer: COMMERCIAL

## 2020-10-22 DIAGNOSIS — Z20.822 COVID-19 RULED OUT: Primary | ICD-10-CM

## 2020-10-22 DIAGNOSIS — K92.1 BLOOD IN STOOL: ICD-10-CM

## 2020-10-22 PROCEDURE — C9803 HOPD COVID-19 SPEC COLLECT: HCPCS | Performed by: SURGERY

## 2020-10-22 PROCEDURE — U0003 INFECTIOUS AGENT DETECTION BY NUCLEIC ACID (DNA OR RNA); SEVERE ACUTE RESPIRATORY SYNDROME CORONAVIRUS 2 (SARS-COV-2) (CORONAVIRUS DISEASE [COVID-19]), AMPLIFIED PROBE TECHNIQUE, MAKING USE OF HIGH THROUGHPUT TECHNOLOGIES AS DESCRIBED BY CMS-2020-01-R: HCPCS | Mod: ZL | Performed by: SURGERY

## 2020-10-22 PROCEDURE — 99207 PR NO CHARGE NURSE ONLY: CPT

## 2020-10-23 LAB
SARS-COV-2 RNA SPEC QL NAA+PROBE: NOT DETECTED
SPECIMEN SOURCE: NORMAL

## 2020-10-26 ENCOUNTER — ANESTHESIA EVENT (OUTPATIENT)
Dept: SURGERY | Facility: OTHER | Age: 60
End: 2020-10-26
Payer: COMMERCIAL

## 2020-10-26 ENCOUNTER — ANESTHESIA (OUTPATIENT)
Dept: SURGERY | Facility: OTHER | Age: 60
End: 2020-10-26
Payer: COMMERCIAL

## 2020-10-26 ENCOUNTER — HOSPITAL ENCOUNTER (OUTPATIENT)
Facility: OTHER | Age: 60
Discharge: HOME | End: 2020-10-26
Attending: SURGERY | Admitting: SURGERY
Payer: COMMERCIAL

## 2020-10-26 VITALS
OXYGEN SATURATION: 96 % | WEIGHT: 174 LBS | HEIGHT: 67 IN | TEMPERATURE: 98.1 F | DIASTOLIC BLOOD PRESSURE: 74 MMHG | BODY MASS INDEX: 27.31 KG/M2 | HEART RATE: 58 BPM | RESPIRATION RATE: 16 BRPM | SYSTOLIC BLOOD PRESSURE: 123 MMHG

## 2020-10-26 PROBLEM — K63.5 COLON POLYPS: Status: ACTIVE | Noted: 2020-10-26

## 2020-10-26 PROBLEM — K57.30 DIVERTICULOSIS OF COLON: Status: ACTIVE | Noted: 2020-10-26

## 2020-10-26 PROCEDURE — 45384 COLONOSCOPY W/LESION REMOVAL: CPT | Mod: XU | Performed by: SURGERY

## 2020-10-26 PROCEDURE — 88305 TISSUE EXAM BY PATHOLOGIST: CPT

## 2020-10-26 PROCEDURE — 45385 COLONOSCOPY W/LESION REMOVAL: CPT

## 2020-10-26 PROCEDURE — 45380 COLONOSCOPY AND BIOPSY: CPT | Performed by: SURGERY

## 2020-10-26 PROCEDURE — 45385 COLONOSCOPY W/LESION REMOVAL: CPT | Performed by: NURSE ANESTHETIST, CERTIFIED REGISTERED

## 2020-10-26 PROCEDURE — 45385 COLONOSCOPY W/LESION REMOVAL: CPT | Performed by: SURGERY

## 2020-10-26 PROCEDURE — 258N000003 HC RX IP 258 OP 636: Performed by: SURGERY

## 2020-10-26 PROCEDURE — 250N000011 HC RX IP 250 OP 636: Performed by: NURSE ANESTHETIST, CERTIFIED REGISTERED

## 2020-10-26 PROCEDURE — 999N000010 HC STATISTIC ANES STAT CODE-CRNA PER MINUTE: Performed by: SURGERY

## 2020-10-26 PROCEDURE — 250N000009 HC RX 250: Performed by: SURGERY

## 2020-10-26 PROCEDURE — 250N000013 HC RX MED GY IP 250 OP 250 PS 637: Performed by: SURGERY

## 2020-10-26 PROCEDURE — 250N000009 HC RX 250: Performed by: NURSE ANESTHETIST, CERTIFIED REGISTERED

## 2020-10-26 RX ORDER — SODIUM CHLORIDE, SODIUM LACTATE, POTASSIUM CHLORIDE, CALCIUM CHLORIDE 600; 310; 30; 20 MG/100ML; MG/100ML; MG/100ML; MG/100ML
INJECTION, SOLUTION INTRAVENOUS CONTINUOUS
Status: DISCONTINUED | OUTPATIENT
Start: 2020-10-26 | End: 2020-10-26 | Stop reason: HOSPADM

## 2020-10-26 RX ORDER — NALOXONE HYDROCHLORIDE 0.4 MG/ML
.1-.4 INJECTION, SOLUTION INTRAMUSCULAR; INTRAVENOUS; SUBCUTANEOUS
Status: DISCONTINUED | OUTPATIENT
Start: 2020-10-26 | End: 2020-10-26 | Stop reason: HOSPADM

## 2020-10-26 RX ORDER — SIMETHICONE 40MG/0.6ML
SUSPENSION, DROPS(FINAL DOSAGE FORM)(ML) ORAL PRN
Status: DISCONTINUED | OUTPATIENT
Start: 2020-10-26 | End: 2020-10-26 | Stop reason: HOSPADM

## 2020-10-26 RX ORDER — LIDOCAINE HYDROCHLORIDE 20 MG/ML
INJECTION, SOLUTION INFILTRATION; PERINEURAL PRN
Status: DISCONTINUED | OUTPATIENT
Start: 2020-10-26 | End: 2020-10-26

## 2020-10-26 RX ORDER — FLUMAZENIL 0.1 MG/ML
0.2 INJECTION, SOLUTION INTRAVENOUS
Status: DISCONTINUED | OUTPATIENT
Start: 2020-10-26 | End: 2020-10-26 | Stop reason: HOSPADM

## 2020-10-26 RX ORDER — LIDOCAINE 40 MG/G
CREAM TOPICAL
Status: DISCONTINUED | OUTPATIENT
Start: 2020-10-26 | End: 2020-10-26 | Stop reason: HOSPADM

## 2020-10-26 RX ORDER — ONDANSETRON 2 MG/ML
4 INJECTION INTRAMUSCULAR; INTRAVENOUS
Status: DISCONTINUED | OUTPATIENT
Start: 2020-10-26 | End: 2020-10-26 | Stop reason: HOSPADM

## 2020-10-26 RX ORDER — PROPOFOL 10 MG/ML
INJECTION, EMULSION INTRAVENOUS CONTINUOUS PRN
Status: DISCONTINUED | OUTPATIENT
Start: 2020-10-26 | End: 2020-10-26

## 2020-10-26 RX ORDER — PROPOFOL 10 MG/ML
INJECTION, EMULSION INTRAVENOUS PRN
Status: DISCONTINUED | OUTPATIENT
Start: 2020-10-26 | End: 2020-10-26

## 2020-10-26 RX ADMIN — SODIUM CHLORIDE, POTASSIUM CHLORIDE, SODIUM LACTATE AND CALCIUM CHLORIDE: 600; 310; 30; 20 INJECTION, SOLUTION INTRAVENOUS at 11:05

## 2020-10-26 RX ADMIN — PROPOFOL 200 MCG/KG/MIN: 10 INJECTION, EMULSION INTRAVENOUS at 11:12

## 2020-10-26 RX ADMIN — SODIUM CHLORIDE, POTASSIUM CHLORIDE, SODIUM LACTATE AND CALCIUM CHLORIDE: 600; 310; 30; 20 INJECTION, SOLUTION INTRAVENOUS at 10:52

## 2020-10-26 RX ADMIN — PROPOFOL 100 MG: 10 INJECTION, EMULSION INTRAVENOUS at 11:12

## 2020-10-26 RX ADMIN — LIDOCAINE HYDROCHLORIDE 100 MG: 20 INJECTION, SOLUTION INFILTRATION; PERINEURAL at 11:12

## 2020-10-26 RX ADMIN — PROPOFOL 50 MG: 10 INJECTION, EMULSION INTRAVENOUS at 11:13

## 2020-10-26 RX ADMIN — PROPOFOL 50 MG: 10 INJECTION, EMULSION INTRAVENOUS at 11:23

## 2020-10-26 RX ADMIN — PROPOFOL 50 MG: 10 INJECTION, EMULSION INTRAVENOUS at 11:15

## 2020-10-26 ASSESSMENT — MIFFLIN-ST. JEOR: SCORE: 1562.89

## 2020-10-26 ASSESSMENT — LIFESTYLE VARIABLES: TOBACCO_USE: 1

## 2020-10-26 NOTE — ANESTHESIA POSTPROCEDURE EVALUATION
Patient: Buddy Bosch    Procedure(s):  COLONOSCOPY, WITH POLYPECTOMY AND BIOPSY    Diagnosis:Blood in stool [K92.1]  Diagnosis Additional Information: No value filed.    Anesthesia Type:  MAC    Note:  Anesthesia Post Evaluation    Patient location during evaluation: Bedside  Patient participation: Able to fully participate in evaluation  Level of consciousness: awake and alert  Pain management: adequate  Airway patency: patent  Cardiovascular status: acceptable  Respiratory status: acceptable  Hydration status: acceptable  PONV: none     Anesthetic complications: None          Last vitals:  Vitals:    10/26/20 1300 10/26/20 1315 10/26/20 1330   BP: 114/78 110/71 123/74   Pulse: 56 55 58   Resp: 14 14 16   Temp:      SpO2: 99% 96% 96%         Electronically Signed By: KIMBERLEE Garcia CRNA  October 26, 2020  3:52 PM

## 2020-10-26 NOTE — DISCHARGE INSTRUCTIONS
[facility]  Adult Discharge Orders & Instructions    ________________________________________________________________          For 12 hours after surgery  1. Get plenty of rest.  A responsible adult must stay with you for at least 12 hours after you leave the hospital.   2. You may feel lightheaded.  IF so, sit for a few minutes before standing.  Have someone help you get up.   3. You may have a slight fever. Call the doctor if your fever is over 101 F (38.3 C) (taken under the tongue) or lasts longer than 24 hours.  4. You may have a dry mouth, a sore throat, muscle aches or trouble sleeping.  These should go away after 24 hours.  5. Do not make important or legal decisions.  6.   Do not drive or use heavy equipment.  If you have weakness or tingling, don't drive or use heavy equipment until this feeling goes away.    To contact a doctor, call   [phone]_______________________

## 2020-10-26 NOTE — OP NOTE
PROCEDURE NOTE    DATE OF SERVICE: 10/26/2020    SURGEON: Fco Munson MD    PRE-OP DIAGNOSIS:    Rectal bleeding        POST-OP DIAGNOSIS:  Same    Diffuse Diverticulosis  Polyps at cecum, AC, HF, mid TC, SF, 35 cm, 25 cm, 10 cm, and rectum    PROCEDURE:     Colonoscopy with multiple snare polypectomies and hot biopsies    ANESTHESIA:  TRACY Goyal CRNA    INDICATION FOR THE PROCEDURE: The patient is a 60 year old male with blood in stool. . The patient has no other complaints  . After explaining the risks to include bleeding, perforation, potential inability toreach the cecum, the patient wished to proceed.    PROCEDURE:After adequate sedation, the patient was in the left lateral decubitus position.  Rectal exam was performed.  There was normal tone and  palpable mass posteriorly.  The colonoscope was introduced into the rectum and advanced to the cecum with Mild difficulty.  The patient's prep was good.  The terminal cecum was reached.  The cecum, ascending, transverse, descending and sigmoid colon was with diffuse diverticulosis. There were multiple polyps at cecum, AC, HF, mid TC, SF 35 cm , 25 cm, 10 cm and in rectum. These were from diminutive to small ( 0.5 cm) to large (1 cm) and over 1 cm pedunculated in rectum near dentate line. They were flat, raised, or pedunculated. They were either hot biopsied and destroyed or completely removed by snare The scope was retroflexed in the rectum.  The rectum was as noted above .  The scope was straightened and removed.  The patient tolerated the procedure well.     ESTIMATED BLOOD LOSS: none    COMPLICATIONS:  None    TISSUE REMOVED:  Yes    RECOMMEND:        Follow-up pending pathology but short 1-3 year interval  Fiber  Given literature on diverticulosis    Fco Munson MD FACS

## 2020-10-26 NOTE — ANESTHESIA CARE TRANSFER NOTE
Patient: Buddy Bosch    Procedure(s):  COLONOSCOPY, WITH POLYPECTOMY AND BIOPSY    Diagnosis: Blood in stool [K92.1]  Diagnosis Additional Information: No value filed.    Anesthesia Type:   MAC     Note:  Airway :Nasal Cannula  Patient transferred to:Phase II  Handoff Report: Identifed the Patient, Identified the Reponsible Provider, Reviewed the pertinent medical history, Discussed the surgical course, Reviewed Intra-OP anesthesia mangement and issues during anesthesia, Set expectations for post-procedure period and Allowed opportunity for questions and acknowledgement of understanding      Vitals: (Last set prior to Anesthesia Care Transfer)    CRNA VITALS  10/26/2020 1212 - 10/26/2020 1242      10/26/2020             Resp Rate (set):  10                Electronically Signed By: KIMBERLEE Vela CRNA  October 26, 2020  12:42 PM

## 2020-10-26 NOTE — ANESTHESIA PREPROCEDURE EVALUATION
Anesthesia Pre-Procedure Evaluation    Patient: Buddy Bosch   MRN: [id number] : 1960          Preoperative Diagnosis: Blood in stool [K92.1]    Procedure(s):  COLONOSCOPY    No past medical history on file.  No past surgical history on file.    Anesthesia Evaluation     . Pt has had prior anesthetic.     No history of anesthetic complications          ROS/MED HX    ENT/Pulmonary:     (+)tobacco use, Current use 0.5 packs/day  , . .    Neurologic:  - neg neurologic ROS     Cardiovascular:  - neg cardiovascular ROS       METS/Exercise Tolerance:  >4 METS   Hematologic:  - neg hematologic  ROS       Musculoskeletal:  - neg musculoskeletal ROS       GI/Hepatic:  - neg GI/hepatic ROS       Renal/Genitourinary:  - ROS Renal section negative       Endo:  - neg endo ROS       Psychiatric: Comment: ETOH 3-4 beers daily        Infectious Disease:  - neg infectious disease ROS       Malignancy:      - no malignancy   Other:    - neg other ROS                      Physical Exam  Normal systems: cardiovascular and pulmonary    Airway   Mallampati: II  TM distance: >3 FB  Neck ROM: full    Dental   (+) missing and chipped    Cardiovascular   Rhythm and rate: regular and normal      Pulmonary             Lab Results   Component Value Date    WBC 10.7 10/09/2020    HGB 16.7 10/09/2020    HCT 49.2 10/09/2020     10/09/2020     10/09/2020    POTASSIUM 4.0 10/09/2020    CHLORIDE 102 10/09/2020    CO2 26 10/09/2020    BUN 14 10/09/2020    CR 1.20 10/09/2020    GLC 96 10/09/2020    CHRISSY 9.8 10/09/2020    ALBUMIN 4.4 10/09/2020    PROTTOTAL 7.6 10/09/2020    ALT 69 (H) 10/09/2020    AST 48 (H) 10/09/2020    ALKPHOS 57 10/09/2020    BILITOTAL 0.8 10/09/2020       Preop Vitals  BP Readings from Last 3 Encounters:   10/09/20 120/70    Pulse Readings from Last 3 Encounters:   10/09/20 76      Resp Readings from Last 3 Encounters:   10/09/20 20    SpO2 Readings from Last 3 Encounters:   10/09/20 96%      Temp  "Readings from Last 1 Encounters:   10/09/20 98.5  F (36.9  C) (Temporal)    Ht Readings from Last 1 Encounters:   10/09/20 1.702 m (5' 7\")      Wt Readings from Last 1 Encounters:   10/09/20 79.4 kg (175 lb)    Estimated body mass index is 27.41 kg/m  as calculated from the following:    Height as of 10/9/20: 1.702 m (5' 7\").    Weight as of 10/9/20: 79.4 kg (175 lb).       Anesthesia Plan      History & Physical Review      ASA Status:  2 .    NPO Status:  > 8 hours    Plan for MAC     Risks, benefits and alternatives discussed and would like to proceed. General anesthesia ok if indicated.           Postoperative Care      Consents                 KIMBERLEE Garcia CRNA  "

## 2020-10-26 NOTE — INTERVAL H&P NOTE
The history and physical has been reviewed and the patient has been examined.  There are no interim changes to the patient's history or physical condition.  Plan: Colonoscopy for blood in stool. Risks of bleeding, perforation, incomplete examination discussed and wishes to proceed. MAC needed for ASA III, alcohol abuse.  Fco Munson MD

## 2020-10-28 PROBLEM — Z86.0101 H/O ADENOMATOUS POLYP OF COLON: Status: ACTIVE | Noted: 2020-10-26

## 2020-10-29 ENCOUNTER — HOSPITAL ENCOUNTER (OUTPATIENT)
Dept: CT IMAGING | Facility: OTHER | Age: 60
Discharge: HOME | End: 2020-10-29
Attending: PHYSICIAN ASSISTANT | Admitting: PHYSICIAN ASSISTANT
Payer: COMMERCIAL

## 2020-10-29 DIAGNOSIS — Z87.891 PERSONAL HISTORY OF TOBACCO USE: ICD-10-CM

## 2020-10-29 PROCEDURE — G0297 LDCT FOR LUNG CA SCREEN: HCPCS

## 2020-10-30 DIAGNOSIS — B18.2 CHRONIC HEPATITIS C WITHOUT HEPATIC COMA (H): Primary | ICD-10-CM

## 2021-01-13 ENCOUNTER — TELEPHONE (OUTPATIENT)
Dept: FAMILY MEDICINE | Facility: OTHER | Age: 61
End: 2021-01-13

## 2021-01-13 NOTE — TELEPHONE ENCOUNTER
Called and talked to patient. Patient was wondering why he had not received a call from gastroenterology in [place] to set up appointment that [facility] had referred him for. Called [facility] and had them confirm they had gotten the referral. They then requested labs done here from 10/9/2020 to be sent and to resend referral. Requested [facility]  to resend referral and to send labs to [facility]. Called patient back and told him to watch for a call from [facility] to schedule appointment.    Rocío Lundberg LPN .......  1/13/2021  2:18 PM

## 2021-01-13 NOTE — TELEPHONE ENCOUNTER
Patient called and stated Dr was going to set up bloodwork in [place]. But he has never heard from them. About three months ago. Please call      Aury Coburn on 1/13/2021 at 1:20 PM

## 2021-01-27 ENCOUNTER — TRANSFERRED RECORDS (OUTPATIENT)
Dept: HEALTH INFORMATION MANAGEMENT | Facility: OTHER | Age: 61
End: 2021-01-27

## 2021-04-27 ENCOUNTER — TRANSFERRED RECORDS (OUTPATIENT)
Dept: HEALTH INFORMATION MANAGEMENT | Facility: OTHER | Age: 61
End: 2021-04-27

## 2021-06-08 ENCOUNTER — TRANSFERRED RECORDS (OUTPATIENT)
Dept: HEALTH INFORMATION MANAGEMENT | Facility: OTHER | Age: 61
End: 2021-06-08

## (undated) DEVICE — ENDO BRUSH CHANNEL MASTER CLEANING 2-4.2MM BW-412T

## (undated) DEVICE — ENDO TRAP POLYP E-TRAP 00711099

## (undated) DEVICE — TUBING SUCTION 10'X3/16" N510

## (undated) DEVICE — ENDO SNARE POLYPECTOMY CRESENT 20MM LOOP SD-221U-25

## (undated) DEVICE — SUCTION MANIFOLD NEPTUNE 2 SYS 4 PORT 0702-020-000

## (undated) DEVICE — SOL WATER 1500ML

## (undated) DEVICE — ENDO KIT COMPLIANCE DYKENDOCMPLY

## (undated) DEVICE — ESU GROUND PAD ADULT W/CORD E7507

## (undated) DEVICE — ESU ENDO FORCEP BX HOT FD-210U

## (undated) RX ORDER — PROPOFOL 10 MG/ML
INJECTION, EMULSION INTRAVENOUS
Status: DISPENSED
Start: 2020-10-26

## (undated) RX ORDER — LIDOCAINE HYDROCHLORIDE 20 MG/ML
INJECTION, SOLUTION EPIDURAL; INFILTRATION; INTRACAUDAL; PERINEURAL
Status: DISPENSED
Start: 2020-10-26